# Patient Record
Sex: FEMALE | Race: WHITE
[De-identification: names, ages, dates, MRNs, and addresses within clinical notes are randomized per-mention and may not be internally consistent; named-entity substitution may affect disease eponyms.]

---

## 2017-08-01 ENCOUNTER — HOSPITAL ENCOUNTER (OUTPATIENT)
Dept: HOSPITAL 7 - FB.SDS | Age: 37
Discharge: HOME | End: 2017-08-01
Attending: SURGERY
Payer: COMMERCIAL

## 2017-08-01 VITALS — SYSTOLIC BLOOD PRESSURE: 126 MMHG | DIASTOLIC BLOOD PRESSURE: 86 MMHG

## 2017-08-01 DIAGNOSIS — K20.9: ICD-10-CM

## 2017-08-01 DIAGNOSIS — K29.50: Primary | ICD-10-CM

## 2017-08-01 DIAGNOSIS — Z79.82: ICD-10-CM

## 2017-08-01 DIAGNOSIS — E66.01: ICD-10-CM

## 2017-08-01 DIAGNOSIS — K29.80: ICD-10-CM

## 2017-08-01 DIAGNOSIS — Z79.899: ICD-10-CM

## 2017-08-01 PROCEDURE — 81025 URINE PREGNANCY TEST: CPT

## 2017-08-01 PROCEDURE — 43239 EGD BIOPSY SINGLE/MULTIPLE: CPT

## 2017-08-01 NOTE — PCM.OPNOTE
- General Post-Op/Procedure Note


Date of Surgery/Procedure: 08/01/17


Operative Procedure(s): egd with bx


Findings: 





erosive esophagitis 


Pre Op Diagnosis: ruq abd pain


Post-Op Diagnosis: Same.  erosive esophagitis


Anesthesia Technique: MAC (s)


Primary Surgeon: Aniket Prince


Anesthesia Provider: Montrell Sow


Pathology: 





duodenum, stomach and distal esophagus 


Complications: None


Condition: Good


Free Text/Narrative:: 





see dictation

## 2017-08-01 NOTE — OR
DATE OF OPERATION:  08/01/2017

 

SURGEON:  Aniket Prince MD

 

PROCEDURE PERFORMED:  Esophagogastroduodenoscopy with cold forceps biopsy.

 

PREOPERATIVE DIAGNOSIS:  Right upper quadrant abdominal pain.

 

POSTOPERATIVE DIAGNOSIS:  Erosive esophagitis.

 

INDICATIONS FOR PROCEDURE:  This is a 37-year-old white female, who is referred

with a history of right upper quadrant abdominal pain.  Workup to date has been

negative with regard to biliary disease.  She was offered and accepted EGD to

further evaluate for possible etiology.

 

DESCRIPTION OF PROCEDURE:  After an excellent IV sedation was administered, the

bite block was inserted.  The flexible endoscope was passed without difficulty

down the patient's esophagus into the stomach.  The stomach was insufflated.

The scope was passed through the pylorus to the second portion of duodenum and

slowly withdrawn.  The following findings were noted.  Duodenum was

unremarkable.  Biopsies were taken due to her complaint of pain.  Stomach was

essentially unremarkable.  Biopsies were taken due to her complaint of pain.

Esophagus at the distal esophagus, there was evidence of erosive esophagitis and

biopsies were taken.  The remainder of the esophageal exam was unremarkable.

The length of the involvement was approximately 3 cm.  The stomach was deflated,

scope was removed.  The patient tolerated the procedure well and was taken to

recovery in good condition.

 

Job#: [id number]

DD: 08/01/2017 0845

DT: 08/01/2017 0910 AS/DANIELLE

## 2017-12-10 ENCOUNTER — HOSPITAL ENCOUNTER (EMERGENCY)
Dept: HOSPITAL 7 - FB.ED | Age: 37
Discharge: NURSING, REHAB OR LONG-TERM CARE | End: 2017-12-10
Payer: COMMERCIAL

## 2017-12-10 VITALS — DIASTOLIC BLOOD PRESSURE: 66 MMHG | SYSTOLIC BLOOD PRESSURE: 138 MMHG

## 2017-12-10 DIAGNOSIS — R10.32: ICD-10-CM

## 2017-12-10 DIAGNOSIS — N39.0: Primary | ICD-10-CM

## 2017-12-10 DIAGNOSIS — E66.9: ICD-10-CM

## 2017-12-10 DIAGNOSIS — Z79.82: ICD-10-CM

## 2017-12-10 PROCEDURE — 87086 URINE CULTURE/COLONY COUNT: CPT

## 2017-12-10 PROCEDURE — 81025 URINE PREGNANCY TEST: CPT

## 2017-12-10 PROCEDURE — 86140 C-REACTIVE PROTEIN: CPT

## 2017-12-10 PROCEDURE — 36415 COLL VENOUS BLD VENIPUNCTURE: CPT

## 2017-12-10 PROCEDURE — 85025 COMPLETE CBC W/AUTO DIFF WBC: CPT

## 2017-12-10 PROCEDURE — 96372 THER/PROPH/DIAG INJ SC/IM: CPT

## 2017-12-10 PROCEDURE — 80053 COMPREHEN METABOLIC PANEL: CPT

## 2017-12-10 PROCEDURE — 81001 URINALYSIS AUTO W/SCOPE: CPT

## 2017-12-10 PROCEDURE — 99284 EMERGENCY DEPT VISIT MOD MDM: CPT

## 2017-12-10 NOTE — EDM.PDOC
ED HPI GENERAL MEDICAL PROBLEM





- General


Chief Complaint: Flank Pain


Stated Complaint: ABDOMINAL PAIN


Time Seen by Provider: 12/10/17 11:00


Source of Information: Reports: Patient


History Limitations: Reports: No Limitations





- History of Present Illness


INITIAL COMMENTS - FREE TEXT/NARRATIVE: 





c/o fever and abd pain x 2d





pain in LLQ





not worked x 2d





no radiation





no dysuria, no frequency





never had colonoscopy





had EGD 8/17 with Dr Prince, showed inflammation of esophagus, had been on 

Protonix, not now





on meloxicam in past, not now





not taken pain meds





some nausea, no vomiting


  ** Flank


Pain Score (Numeric/FACES): 4





- Related Data


 Allergies











Allergy/AdvReac Type Severity Reaction Status Date / Time


 


No Known Allergies Allergy   Verified 12/10/17 11:53











Home Meds: 


 Home Meds





Aspirin [Burke Aspirin] 81 mg PO DAILY 07/31/17 [History]


Cholecalciferol (Vitamin D3) [Delta D3] 400 unit PO DAILY 07/31/17 [History]


Ciprofloxacin HCl [Cipro] 500 mg PO BID 7 Days #14 tablet 12/10/17 [Rx]


Metoclopramide HCl 10 mg PO Q6H PRN #12 tablet 12/10/17 [Rx]


Naproxen [Naprosyn] 500 mg PO BID #30 tablet 12/10/17 [Rx]


metroNIDAZOLE [Flagyl] 500 mg PO TID 7 Days #21 tablet 12/10/17 [Rx]











Past Medical History





- Past Health History


Medical/Surgical History: Denies Medical/Surgical History


Cardiovascular History: Reports: Other (See Below)


Other Cardiovascular History: ATYPICAL CHEST PAIN


Genitourinary History: Reports: UTI, Recurrent


Endocrine/Metabolic History: Reports: Obesity/BMI 30+





Social & Family History





- Family History


Family Medical History: Noncontributory





- Tobacco Use


Smoking Status *Q: Never Smoker


Second Hand Smoke Exposure: Yes





- Caffeine Use


Caffeine Use: Reports: None





- Recreational Drug Use


Recreational Drug Use: No





ED ROS GENERAL





- Review of Systems


Review Of Systems: See Below


Constitutional: Reports: Fever, Other (temp 102.9 at home)


HEENT: Reports: No Symptoms


Respiratory: Reports: No Symptoms


Cardiovascular: Reports: No Symptoms


Endocrine: Reports: No Symptoms


GI/Abdominal: Reports: Abdominal Pain, Nausea.  Denies: Vomiting


: Reports: No Symptoms, Other (last menses 1w ago, usual 4d flow).  Denies: 

Dysuria


Musculoskeletal: Reports: No Symptoms


Skin: Reports: No Symptoms


Neurological: Reports: No Symptoms


Psychiatric: Reports: No Symptoms


Hematologic/Lymphatic: Reports: No Symptoms


Immunologic: Reports: No Symptoms





ED EXAM, GI/ABD





- Physical Exam


Exam: See Below


Exam Limited By: No Limitations


General Appearance: Alert, WD/WN, Mild Distress


Ears: Normal External Exam, Normal Canal, Hearing Grossly Normal


Nose: Normal Inspection, Normal Mucosa, No Blood


Throat/Mouth: Normal Inspection, Normal Lips, Normal Teeth, Normal Gums, Normal 

Oropharynx, Normal Voice, No Airway Compromise


Head: Atraumatic, Normocephalic


Neck: Normal Inspection, Supple, Non-Tender, Full Range of Motion


Respiratory/Chest: No Respiratory Distress, Lungs Clear, Normal Breath Sounds, 

No Accessory Muscle Use, Chest Non-Tender


Cardiovascular: Regular Rate, Rhythm, No Edema, No Gallop, No JVD, No Murmur, 

No Rub


GI/Abdominal Exam: Other (obese, soft, BS present x 4, tender at LLQ along 

lower 1/2 of L colon, slight tender in suprapubic area, NT at flanks and upper 

abd and RLQ, no CVAT b/l)


Back Exam: Normal Inspection, Full Range of Motion, NT


Extremities: Normal Inspection, Normal Range of Motion, Non-Tender, No Pedal 

Edema


Neurological: Alert, Oriented, CN II-XII Intact, Normal Cognition, Normal Gait, 

No Motor/Sensory Deficits


Psychiatric: Normal Affect, Normal Mood


Skin Exam: Warm, Dry, Intact, Normal Color, No Rash


Lymphatic: No Adenopathy





Course





- Vital Signs


Last Recorded V/S: 


 Last Vital Signs











Temp  37.0 C   12/10/17 11:00


 


Pulse  100   12/10/17 10:00


 


Resp  16   12/10/17 10:00


 


BP  144/83 H  12/10/17 10:00


 


Pulse Ox  100   12/10/17 10:00














- Orders/Labs/Meds


Orders: 


 Active Orders 24 hr











 Category Date Time Status


 


 CULTURE URINE [RM] Stat Lab  12/10/17 10:00 Received


 


 Ondansetron [Zofran ODT] Med  12/10/17 12:24 Once





 4 mg PO ONETIME ONE   











Labs: 


 Laboratory Tests











  12/10/17 12/10/17 12/10/17 Range/Units





  10:00 10:00 11:17 


 


WBC    8.9  (4.5-12.0)  X10-3/uL


 


RBC    4.60  (3.23-5.20)  x10(6)uL


 


Hgb    13.2  (11.5-15.5)  g/dL


 


Hct    39.5  (30.0-51.3)  %


 


MCV    85.7  (80-96)  fL


 


MCH    28.6  (27.7-33.6)  pg


 


MCHC    33.4  (32.2-35.4)  g/dL


 


RDW    13.5  (11.5-15.5)  %


 


Plt Count    281  (125-369)  X10(3)uL


 


MPV    7.9  (7.4-10.4)  fL


 


Neut % (Auto)    65.0  (46-82)  %


 


Lymph % (Auto)    24.8  (13-37)  %


 


Mono % (Auto)    7.3  (4-12)  %


 


Eos % (Auto)    3  (1.0-5.0)  %


 


Baso % (Auto)    0  (0-2)  %


 


Neut # (Auto)    5.8  (1.6-8.3)  #


 


Lymph # (Auto)    2.2  (0.6-5.0)  #


 


Mono # (Auto)    0.7  (0.0-1.3)  #


 


Eos # (Auto)    0.2  (0.0-0.8)  #


 


Baso # (Auto)    0.0  (0.0-0.2)  #


 


Sodium     (135-145)  mmol/L


 


Potassium     (3.5-5.3)  mmol/L


 


Chloride     (100-110)  mmol/L


 


Carbon Dioxide     (21-32)  mmol/L


 


BUN     (7-18)  mg/dL


 


Creatinine     (0.55-1.02)  mg/dL


 


Est Cr Clr Drug Dosing     


 


Estimated GFR (MDRD)     (>60)  


 


BUN/Creatinine Ratio     (9-20)  


 


Glucose     ()  mg/dL


 


Calcium     (8.6-10.2)  mg/dL


 


Total Bilirubin     (0.1-1.3)  mg/dL


 


AST     (5-25)  IU/L


 


ALT     (12-36)  U/L


 


Alkaline Phosphatase     ()  IU/L


 


C-Reactive Protein     (0.5-0.9)  mg/dL


 


Total Protein     (6.0-8.0)  g/dL


 


Albumin     (3.5-5.2)  g/dL


 


Globulin     g/dL


 


Albumin/Globulin Ratio     


 


Urine Color   Yellow   (YELLOW)  


 


Urine Appearance   Clear   (CLEAR)  


 


Urine pH   5.0   (5.0-6.5)  


 


Ur Specific Gravity   1.020   (1.010-1.025)  


 


Urine Protein   Negative   (NEGATIVE)  mg/dL


 


Urine Glucose (UA)   Normal   (NEGATIVE)  mg/dL


 


Urine Ketones   Negative   (NEGATIVE)  mg/dL


 


Urine Occult Blood   Negative   (NEGATIVE)  


 


Urine Nitrite   Negative   (NEGATIVE)  


 


Urine Bilirubin   Negative   (NEGATIVE)  


 


Urine Urobilinogen   Normal   (NEGATIVE)  mg/dL


 


Ur Leukocyte Esterase   Moderate H   (NEGATIVE)  


 


Urine RBC   0-5   (0)  


 


Urine WBC   5-10   (0)  


 


Ur Squamous Epith Cells   Few H   (NS,R,O)  


 


Urine Bacteria   Moderate H   (NS)  


 


Urine HCG, Qual  Negative    (NEGATIVE)  














  12/10/17 12/10/17 Range/Units





  11:17 11:17 


 


WBC    (4.5-12.0)  X10-3/uL


 


RBC    (3.23-5.20)  x10(6)uL


 


Hgb    (11.5-15.5)  g/dL


 


Hct    (30.0-51.3)  %


 


MCV    (80-96)  fL


 


MCH    (27.7-33.6)  pg


 


MCHC    (32.2-35.4)  g/dL


 


RDW    (11.5-15.5)  %


 


Plt Count    (125-369)  X10(3)uL


 


MPV    (7.4-10.4)  fL


 


Neut % (Auto)    (46-82)  %


 


Lymph % (Auto)    (13-37)  %


 


Mono % (Auto)    (4-12)  %


 


Eos % (Auto)    (1.0-5.0)  %


 


Baso % (Auto)    (0-2)  %


 


Neut # (Auto)    (1.6-8.3)  #


 


Lymph # (Auto)    (0.6-5.0)  #


 


Mono # (Auto)    (0.0-1.3)  #


 


Eos # (Auto)    (0.0-0.8)  #


 


Baso # (Auto)    (0.0-0.2)  #


 


Sodium  138   (135-145)  mmol/L


 


Potassium  4.0   (3.5-5.3)  mmol/L


 


Chloride  103   (100-110)  mmol/L


 


Carbon Dioxide  27   (21-32)  mmol/L


 


BUN  8   (7-18)  mg/dL


 


Creatinine  0.7   (0.55-1.02)  mg/dL


 


Est Cr Clr Drug Dosing  TNP   


 


Estimated GFR (MDRD)  > 60   (>60)  


 


BUN/Creatinine Ratio  11.4   (9-20)  


 


Glucose  100   ()  mg/dL


 


Calcium  8.9   (8.6-10.2)  mg/dL


 


Total Bilirubin  0.3   (0.1-1.3)  mg/dL


 


AST  12   (5-25)  IU/L


 


ALT  18   (12-36)  U/L


 


Alkaline Phosphatase  52 L   ()  IU/L


 


C-Reactive Protein   1.8 H  (0.5-0.9)  mg/dL


 


Total Protein  8.0   (6.0-8.0)  g/dL


 


Albumin  3.6   (3.5-5.2)  g/dL


 


Globulin  4.4   g/dL


 


Albumin/Globulin Ratio  0.8   


 


Urine Color    (YELLOW)  


 


Urine Appearance    (CLEAR)  


 


Urine pH    (5.0-6.5)  


 


Ur Specific Gravity    (1.010-1.025)  


 


Urine Protein    (NEGATIVE)  mg/dL


 


Urine Glucose (UA)    (NEGATIVE)  mg/dL


 


Urine Ketones    (NEGATIVE)  mg/dL


 


Urine Occult Blood    (NEGATIVE)  


 


Urine Nitrite    (NEGATIVE)  


 


Urine Bilirubin    (NEGATIVE)  


 


Urine Urobilinogen    (NEGATIVE)  mg/dL


 


Ur Leukocyte Esterase    (NEGATIVE)  


 


Urine RBC    (0)  


 


Urine WBC    (0)  


 


Ur Squamous Epith Cells    (NS,R,O)  


 


Urine Bacteria    (NS)  


 


Urine HCG, Qual    (NEGATIVE)  











Meds: 


Medications














Discontinued Medications














Generic Name Dose Route Start Last Admin





  Trade Name Tarik  PRN Reason Stop Dose Admin


 


Ciprofloxacin  500 mg  12/10/17 10:54  12/10/17 11:59





  Ciprofloxacin Hcl  PO  12/10/17 10:55  500 mg





  ONETIME ONE   Administration


 


Ketorolac Tromethamine  60 mg  12/10/17 10:53  12/10/17 12:00





  Toradol  IM  12/10/17 10:54  60 mg





  ONETIME ONE   Administration


 


Metronidazole  500 mg  12/10/17 10:55  12/10/17 11:59





  Flagyl  PO  12/10/17 10:56  500 mg





  ONETIME ONE   Administration














- Re-Assessments/Exams


Free Text/Narrative Re-Assessment/Exam: 





12/10/17 12:25


feeling better after Toradol, still some N, missed work x last 3d





CBC & CMP neg, CRP 1.8, u/a with WBC and bacteria





has UTI





however PE more c/w diverticulitis





will tx for both UTI and diverticulitis





will need imaging if she does not feel better on antibiotics





Departure





- Departure


Time of Disposition: 12:26


Disposition: DC/Tfer to [facility]


Condition: Good


Clinical Impression: 


 Febrile urinary tract infection, Abdominal wall pain in left lower quadrant








- Discharge Information


Prescriptions: 


Ciprofloxacin HCl [Cipro] 500 mg PO BID 7 Days #14 tablet


Metoclopramide HCl 10 mg PO Q6H PRN #12 tablet


 PRN Reason: Nausea


metroNIDAZOLE [Flagyl] 500 mg PO TID 7 Days #21 tablet


Naproxen [Naprosyn] 500 mg PO BID #30 tablet


Referrals: 


Castillo Menard MD [Primary Care Provider] - 


Forms:  ED Department Discharge


Additional Instructions: 


For infection, take ciprofloxacin 500 mg 1 tab 2 times a day for 7 days.





For infection, take metronidazole 500 mg 1 tab 3 times a day for 7 days.





For pain, take naprosyn 500 mg 1 tab 2 times a day for 7 days.





For pain, also take acetaminophen 500 mg 2 tabs 4 times a day.





For nausea, take metoclopramide 10 mg 1 tab every 6 hours as needed.





No work 12/7 to 12/12.





See Dr Menard in 2 days.





Return to ED if you are feeling worse.





Call your Physician or Return to Emergency Department if:





   * Your condition worsens in any way.


   * You develop fever greater than 100.4.


   * You have vomitting that does not stop with medications.


   * You have pain that is not controlled with medications.





- My Orders


Last 24 Hours: 


My Active Orders





12/10/17 10:00


CULTURE URINE [RM] Stat 





12/10/17 12:24


Ondansetron [Zofran ODT]   4 mg PO ONETIME ONE 














- Assessment/Plan


Last 24 Hours: 


My Active Orders





12/10/17 10:00


CULTURE URINE [RM] Stat 





12/10/17 12:24


Ondansetron [Zofran ODT]   4 mg PO ONETIME ONE

## 2018-01-01 ENCOUNTER — HOSPITAL ENCOUNTER (EMERGENCY)
Dept: HOSPITAL 7 - FB.ED | Age: 38
Discharge: HOME | End: 2018-01-01
Payer: COMMERCIAL

## 2018-01-01 VITALS — SYSTOLIC BLOOD PRESSURE: 160 MMHG | DIASTOLIC BLOOD PRESSURE: 90 MMHG

## 2018-01-01 DIAGNOSIS — K64.4: Primary | ICD-10-CM

## 2018-01-01 DIAGNOSIS — E66.01: ICD-10-CM

## 2018-01-01 DIAGNOSIS — Z76.0: ICD-10-CM

## 2018-01-01 DIAGNOSIS — R09.1: ICD-10-CM

## 2018-01-01 DIAGNOSIS — Z77.22: ICD-10-CM

## 2018-01-01 PROCEDURE — 36415 COLL VENOUS BLD VENIPUNCTURE: CPT

## 2018-01-01 PROCEDURE — 85025 COMPLETE CBC W/AUTO DIFF WBC: CPT

## 2018-01-01 PROCEDURE — 85610 PROTHROMBIN TIME: CPT

## 2018-01-01 PROCEDURE — 99284 EMERGENCY DEPT VISIT MOD MDM: CPT

## 2018-01-01 PROCEDURE — 80048 BASIC METABOLIC PNL TOTAL CA: CPT

## 2018-01-01 NOTE — EDM.PDOC
ED HPI GENERAL MEDICAL PROBLEM





- General


Chief Complaint: Gastrointestinal Problem


Stated Complaint: BLOODY STOOL


Time Seen by Provider: 01/01/18 19:22


Source of Information: Reports: Patient, Family


History Limitations: Reports: No Limitations





- History of Present Illness


INITIAL COMMENTS - FREE TEXT/NARRATIVE: 





37 y.o.w.f -morbid obese-came with her mom to the ed due to blood in her stool 

on 2 occasions and epigastric pain. Pt ran out of her PPIs. Pt underwent and 

upper Endoscopy a few months ago and was dx'd with Gastric reflux disease. No N?

V/D no Dizziness or any  other acute medical issues. /90 puse 105 Temp 

36.2  Pulse ox 99% on RA. 


Onset Date: 12/30/17


Onset Time: 07:00


Duration: Day(s):, Intermittent


Location: Reports: Abdomen


Quality: Reports: Other (occ brightred blood in stool)


Severity: Mild


Improves with: Reports: Rest


Worsens with: Reports: Movement


Associated Symptoms: Reports: Other (occ epigastric pain. Pt ran out of her PPBs

)





- Related Data


 Allergies











Allergy/AdvReac Type Severity Reaction Status Date / Time


 


No Known Allergies Allergy   Verified 01/01/18 19:43











Home Meds: 


 Home Meds





Dicyclomine [Bentyl] 10 mg PO QIDACANDBED 01/01/18 [History]


Omeprazole [Omeprazole] 20 mg PO DAILY 01/01/18 [History]


Pantoprazole [ProTONIX Granules***] 40 mg PO DAILY #30 packet 01/01/18 [Rx]











Past Medical History





- Past Health History


Medical/Surgical History: Denies Medical/Surgical History


Cardiovascular History: Reports: Other (See Below)


Other Cardiovascular History: ATYPICAL CHEST PAIN


Gastrointestinal History: Reports: Other (See Below)


Other Gastrointestinal History: overweight


Genitourinary History: Reports: UTI, Recurrent


Endocrine/Metabolic History: Reports: Obesity/BMI 30+





- Infectious Disease History


Infectious Disease History: Reports: Chicken Pox





- Past Surgical History


HEENT Surgical History: Reports: Other (See Below)


Other HEENT Surgeries/Procedures: had oral surgery at one time





Social & Family History





- Family History


Family Medical History: Noncontributory





- Tobacco Use


Smoking Status *Q: Never Smoker


Second Hand Smoke Exposure: Yes





- Caffeine Use


Caffeine Use: Reports: Soda


Caffeine Use Comment: 2 cans a day





- Alcohol Use


Days Per Week of Alcohol Use: 0





- Recreational Drug Use


Recreational Drug Use: No





ED ROS GENERAL





- Review of Systems


Review Of Systems: See Below


Constitutional: Reports: No Symptoms


HEENT: Reports: No Symptoms


Respiratory: Reports: No Symptoms


Cardiovascular: Reports: No Symptoms


Endocrine: Reports: No Symptoms


GI/Abdominal: Reports: Abdominal Pain (epigastric), Bloody Stool (occ)


: Reports: No Symptoms


Musculoskeletal: Reports: No Symptoms


Skin: Reports: No Symptoms


Neurological: Reports: No Symptoms


Psychiatric: Reports: No Symptoms


Hematologic/Lymphatic: Reports: No Symptoms


Immunologic: Reports: No Symptoms





ED EXAM, GI/ABD





- Physical Exam


Exam: See Below


Exam Limited By: No Limitations


General Appearance: Alert, WD/WN, No Apparent Distress, Obese (morbid)


Eyes: Bilateral: Normal Appearance


Ears: Normal External Exam


Nose: Normal Inspection


Throat/Mouth: Normal Inspection


Head: Atraumatic, Normocephalic


Neck: Normal Inspection, Supple, Non-Tender, Full Range of Motion


Respiratory/Chest: No Respiratory Distress, Lungs Clear, Normal Breath Sounds


Cardiovascular: Normal Peripheral Pulses, Regular Rate, Rhythm, No Edema, No 

Gallop, No JVD, No Murmur, No Rub


GI/Abdominal Exam: Normal Bowel Sounds, Soft, Non-Tender, No Organomegaly, No 

Abnormal Bruit, Other (external hemorrhids, no bleed)


 (Female) Exam: Deferred


Rectal (Female) Exam: Normal Exam


Back Exam: Normal Inspection, Full Range of Motion


Extremities: Normal Inspection, Normal Range of Motion


Neurological: Alert, Oriented, CN II-XII Intact, Normal Cognition, Normal Gait


Psychiatric: Normal Affect, Normal Mood


Skin Exam: Warm, Dry, Intact, Normal Color, No Rash


Lymphatic: No Adenopathy





Course





- Vital Signs


Text/Narrative:: 





37 y.o.w.f -morbid obese-came with her mom to the ed due to blood in her stool 

on 2 occasions and epigastric pain. Pt ran out of her PPIs. Pt underwent and 

upper Endoscopy a few months ago and was dx'd with Gastric reflux disease. No N?

V/D no Dizziness or any  other acute medical issues. /90 puse 105 Temp 

36.5  Pulse ox 99% on RA.


PE: Morbid obese 37 y.o.w.f with epigastic tenderness, minor, no rebound no 

guarding. one external hemorrhoid, no thrombosed, no bleed.


Labs: CBC and BMO were nl, Glc was 160, however.


Impression: Hematochezia, external hemorrhoid


Tx: Meds refill for PPI


Plan: D/C with instructions.  


Last Recorded V/S: 


 Last Vital Signs











Temp  36.5 C   01/01/18 19:39


 


Pulse  105 H  01/01/18 19:39


 


Resp  19   01/01/18 19:39


 


BP  160/90 H  01/01/18 19:39


 


Pulse Ox  100   01/01/18 19:39














- Orders/Labs/Meds


Labs: 


 Laboratory Tests











  01/01/18 01/01/18 01/01/18 Range/Units





  19:55 19:55 19:55 


 


WBC  11.4    (4.5-12.0)  X10-3/uL


 


RBC  4.44    (3.23-5.20)  x10(6)uL


 


Hgb  12.8    (11.5-15.5)  g/dL


 


Hct  38.1    (30.0-51.3)  %


 


MCV  85.7    (80-96)  fL


 


MCH  28.7    (27.7-33.6)  pg


 


MCHC  33.5    (32.2-35.4)  g/dL


 


RDW  13.3    (11.5-15.5)  %


 


Plt Count  299    (125-369)  X10(3)uL


 


MPV  8.5    (7.4-10.4)  fL


 


Neut % (Auto)  75.4    (46-82)  %


 


Lymph % (Auto)  17.5    (13-37)  %


 


Mono % (Auto)  5.1    (4-12)  %


 


Eos % (Auto)  2    (1.0-5.0)  %


 


Baso % (Auto)  0    (0-2)  %


 


Neut # (Auto)  8.6 H    (1.6-8.3)  #


 


Lymph # (Auto)  2.0    (0.6-5.0)  #


 


Mono # (Auto)  0.6    (0.0-1.3)  #


 


Eos # (Auto)  0.2    (0.0-0.8)  #


 


Baso # (Auto)  0.0    (0.0-0.2)  #


 


PT   9.7   (8.7-11.1)  


 


INR   0.96   (0.89-1.13)  


 


Sodium    139  (135-145)  mmol/L


 


Potassium    4.0  (3.5-5.3)  mmol/L


 


Chloride    102  (100-110)  mmol/L


 


Carbon Dioxide    24  (21-32)  mmol/L


 


BUN    12  (7-18)  mg/dL


 


Creatinine    0.9  (0.55-1.02)  mg/dL


 


Est Cr Clr Drug Dosing    69.00  mL/min


 


Estimated GFR (MDRD)    > 60  (>60)  


 


BUN/Creatinine Ratio    13.3  (9-20)  


 


Glucose    196 H D  ()  mg/dL


 


Calcium    8.9  (8.6-10.2)  mg/dL











Meds: 


Medications














Discontinued Medications














Generic Name Dose Route Start Last Admin





  Trade Name Freq  PRN Reason Stop Dose Admin


 


Al Hydroxide/Mg Hydroxide  30 ml  01/01/18 19:44  01/01/18 19:48





  Mag-Al Susp  PO  01/01/18 19:45  30 ml





  ONETIME STA   Administration














Departure





- Departure


Time of Disposition: 20:34


Disposition: Home, Self-Care 01


Condition: Good


Clinical Impression: 


 Hematochezia, Medication refill, Pleurisy








- Discharge Information


Prescriptions: 


Pantoprazole [ProTONIX Granules***] 40 mg PO DAILY #30 packet


Instructions:  Hemorrhoids, Easy-to-Read, Secondhand Smoke


Referrals: 


Castillo Menard MD [Primary Care Provider] - 


Forms:  ED Department Discharge


Additional Instructions: 


Please take the meds as recommended, please f/u with your PMD/Dr. Prince. 

please come back if your symptoms get worse acutely

## 2018-01-11 ENCOUNTER — HOSPITAL ENCOUNTER (OUTPATIENT)
Dept: HOSPITAL 7 - FB.SDS | Age: 38
Discharge: HOME | End: 2018-01-11
Attending: SURGERY
Payer: COMMERCIAL

## 2018-01-11 VITALS — DIASTOLIC BLOOD PRESSURE: 73 MMHG | SYSTOLIC BLOOD PRESSURE: 129 MMHG

## 2018-01-11 DIAGNOSIS — Z79.82: ICD-10-CM

## 2018-01-11 DIAGNOSIS — K57.30: Primary | ICD-10-CM

## 2018-01-11 DIAGNOSIS — E66.01: ICD-10-CM

## 2018-01-11 DIAGNOSIS — Z79.899: ICD-10-CM

## 2018-01-11 PROCEDURE — 45378 DIAGNOSTIC COLONOSCOPY: CPT

## 2018-01-11 PROCEDURE — 81025 URINE PREGNANCY TEST: CPT

## 2018-01-11 NOTE — OR
DATE OF OPERATION:  01/11/2018

 

SURGEON:  Aniket Prince MD

 

PROCEDURE PERFORMED:  Colonoscopy.

 

PREOPERATIVE DIAGNOSIS:  History of left upper quadrant abdominal pain as well

as bleeding per rectum.

 

POSTOPERATIVE DIAGNOSIS:  Sigmoid diverticulosis.

 

INDICATIONS FOR PROCEDURE:  This is a 37-year-old white female who was referred

with the above-mentioned complaints.  She was offered and accepted a

colonoscopy.

 

DESCRIPTION OF PROCEDURE:  After an excellent IV sedation was administered,

digital rectal exam was performed.  No marked abnormality was noted.  The

flexible colonoscope was inserted and advanced to the cecum without difficulty.

The prep was excellent.  The following findings were noted.  Ascending colon was

unremarkable.  Transverse colon, unremarkable.  Descending colon, unremarkable.

Sigmoid, occasional diverticula noted.  Rectum, unremarkable and on retroflexing

the scope and examining the anus, there was really no marked hemorrhoidal

tissue.  The colon was deflated.  The scope was removed.  We will have the

patient start a high-fiber diet of either med to include Metamucil and Citrucel

as well as a probiotic and follow up in one month if there is no improvement.

 

Job#: [id number]

DD: 01/11/2018 0922

DT: 01/11/2018 1116 AS/MODL

## 2018-01-11 NOTE — PCM.OPNOTE
- General Post-Op/Procedure Note


Date of Surgery/Procedure: 01/11/18


Operative Procedure(s): c scope


Findings: 





sigmoid diverticulosis 


Pre Op Diagnosis: luq abd pain and hx of bleeding per rectum


Post-Op Diagnosis: sigmoid diverticulosis


Anesthesia Technique: MAC


Primary Surgeon: Aniket Prince


Anesthesia Provider: Montrell Sow


Pathology: 





none





Complications: None


Condition: Good


Free Text/Narrative:: 





see dictation

## 2018-01-13 ENCOUNTER — HOSPITAL ENCOUNTER (EMERGENCY)
Dept: HOSPITAL 7 - FB.ED | Age: 38
Discharge: HOME | End: 2018-01-13
Payer: COMMERCIAL

## 2018-01-13 VITALS — DIASTOLIC BLOOD PRESSURE: 97 MMHG | SYSTOLIC BLOOD PRESSURE: 148 MMHG

## 2018-01-13 DIAGNOSIS — Z77.22: ICD-10-CM

## 2018-01-13 DIAGNOSIS — J20.9: Primary | ICD-10-CM

## 2018-01-13 DIAGNOSIS — Z79.82: ICD-10-CM

## 2018-01-13 PROCEDURE — 99284 EMERGENCY DEPT VISIT MOD MDM: CPT

## 2018-01-13 NOTE — EDM.PDOC
ED HPI GENERAL MEDICAL PROBLEM





- General


Chief Complaint: Respiratory Problem


Stated Complaint: CHEST PAIN


Time Seen by Provider: 01/13/18 20:23


Source of Information: Reports: Patient


History Limitations: Reports: No Limitations





- History of Present Illness


INITIAL COMMENTS - FREE TEXT/NARRATIVE: 





37 y.o.w.f came the the ed with a prd. cough for 1-2 days. Pt does not smoke. 

No F/C N/V or any other acute medical issues.  /108 Pulse 104 Temp 37.1 

Pulse ox 98% on RA, RR 18.  


Onset: Today


Onset Date: 01/12/18


Onset Time: 07:00


Duration: Day(s):, Intermittent


Location: Reports: Chest


Quality: Reports: Ache


Severity: Mild


Improves with: Reports: Rest


Worsens with: Reports: Movement


Context: Reports: Sick Contact





- Related Data


 Allergies











Allergy/AdvReac Type Severity Reaction Status Date / Time


 


No Known Allergies Allergy   Verified 01/13/18 20:29











Home Meds: 


 Home Meds





Aspirin [Halfprin] 81 mg PO Q48H 01/10/18 [History]


Cholecalciferol (Vitamin D3) [Vitamin D3] 400 unit PO DAILY 01/10/18 [History]


Ciprofloxacin HCl [Cipro] 500 mg PO BID #20 tablet 01/13/18 [Rx]











Past Medical History





- Past Health History


Medical/Surgical History: Denies Medical/Surgical History


Cardiovascular History: Reports: Other (See Below)


Other Cardiovascular History: ATYPICAL CHEST PAIN


Respiratory History: Reports: None


Gastrointestinal History: Reports: Gastritis, Other (See Below)


Other Gastrointestinal History: overweight


Genitourinary History: Reports: UTI, Recurrent


OB/GYN History: Reports: None


Musculoskeletal History: Reports: None


Neurological History: Reports: None


Psychiatric History: Reports: None


Endocrine/Metabolic History: Reports: Obesity/BMI 30+


Hematologic History: Reports: None


Immunologic History: Reports: None


Oncologic (Cancer) History: Reports: None


Dermatologic History: Reports: None





- Infectious Disease History


Infectious Disease History: Reports: Chicken Pox





- Past Surgical History


Head Surgeries/Procedures: Reports: None


HEENT Surgical History: Reports: Oral Surgery, Other (See Below)


Other HEENT Surgeries/Procedures: had oral surgery at one time


Respiratory Surgical History: Reports: None


GI Surgical History: Reports: EGD


Female  Surgical History: Reports: None


Endocrine Surgical History: Reports: None


Neurological Surgical History: Reports: None


Musculoskeletal Surgical History: Reports: None


Oncologic Surgical History: Reports: None


Dermatological Surgical History: Reports: None





Social & Family History





- Family History


Family Medical History: Noncontributory





- Tobacco Use


Smoking Status *Q: Never Smoker


Second Hand Smoke Exposure: Yes





- Caffeine Use


Caffeine Use: Reports: None


Caffeine Use Comment: 2 cans a day





- Alcohol Use


Days Per Week of Alcohol Use: 0





- Recreational Drug Use


Recreational Drug Use: No





ED ROS GENERAL





- Review of Systems


Review Of Systems: See Below


Constitutional: Reports: No Symptoms


HEENT: Reports: No Symptoms


Respiratory: Reports: Cough, Sputum


Cardiovascular: Reports: No Symptoms


Endocrine: Reports: No Symptoms


GI/Abdominal: Reports: No Symptoms


: Reports: No Symptoms


Musculoskeletal: Reports: No Symptoms


Skin: Reports: No Symptoms


Neurological: Reports: No Symptoms


Psychiatric: Reports: No Symptoms


Hematologic/Lymphatic: Reports: No Symptoms


Immunologic: Reports: No Symptoms





ED EXAM, GENERAL





- Physical Exam


Exam: See Below


Exam Limited By: No Limitations


General Appearance: Alert, WD/WN, No Apparent Distress, Obese


Eye Exam: Bilateral Eye: Normal Inspection


Ears: Normal External Exam


Ear Exam: Bilateral Ear: Auricle Normal


Nose: Normal Inspection, Normal Mucosa


Throat/Mouth: Normal Inspection, Normal Lips, No Airway Compromise


Head: Atraumatic, Normocephalic


Neck: Normal Inspection, Supple, Non-Tender, Full Range of Motion


Respiratory/Chest: Rhonchi


Cardiovascular: Normal Peripheral Pulses, Regular Rate, Rhythm


GI/Abdominal: Normal Bowel Sounds


 (Female) Exam: Deferred


Rectal (Female) Exam: Deferred


Back Exam: Normal Inspection


Extremities: Normal Inspection


Neurological: Alert, Oriented, CN II-XII Intact, Normal Cognition, Normal Gait, 

No Motor/Sensory Deficits


Psychiatric: Normal Affect, Normal Mood


Skin Exam: Warm, Dry, Intact, Normal Color, No Rash


Lymphatic: No Adenopathy





Course





- Vital Signs


Text/Narrative:: 





37 y.o.w.f came the the ed with a prd. cough for 1-2 days. Pt does not smoke. 

No F/C N/V or any other acute medical issues.  /108 Pulse 104 Temp 37.1 

Pulse ox 98% on RA, RR 18.


PE: Rhonchi with prod cough


Impression: Acute Bronchitis


Tx: cipro


Reexam: Improved


Plan: D/C with instructions





Last Recorded V/S: 


 Last Vital Signs











Temp  37.1 C   01/13/18 20:31


 


Pulse  102 H  01/13/18 20:45


 


Resp  20   01/13/18 20:31


 


BP  148/97 H  01/13/18 20:45


 


Pulse Ox  98   01/13/18 20:31














- Orders/Labs/Meds


Meds: 


Medications














Discontinued Medications














Generic Name Dose Route Start Last Admin





  Trade Name Tarik  PRN Reason Stop Dose Admin


 


Ciprofloxacin  500 mg  01/13/18 20:31  01/13/18 20:41





  Ciprofloxacin Hcl  PO  01/13/18 20:32  500 mg





  ONETIME STA   Administration














Departure





- Departure


Time of Disposition: 20:32


Disposition: Home, Self-Care 01


Condition: Good


Clinical Impression: 


Acute bronchitis


Qualifiers:


 Bronchitis organism: unspecified organism Qualified Code(s): J20.9 - Acute 

bronchitis, unspecified








- Discharge Information


Prescriptions: 


Ciprofloxacin HCl [Cipro] 500 mg PO BID #20 tablet


Referrals: 


Castillo Menard MD [Primary Care Provider] - 


Forms:  ED Department Discharge


Additional Instructions: 


Please increase water intake, please take the meds as  recommended, please 

follow up, come back if your symptoms get worse acutely

## 2018-02-13 ENCOUNTER — HOSPITAL ENCOUNTER (EMERGENCY)
Dept: HOSPITAL 7 - FB.ED | Age: 38
LOS: 1 days | Discharge: HOME | End: 2018-02-14
Payer: COMMERCIAL

## 2018-02-13 DIAGNOSIS — G43.909: Primary | ICD-10-CM

## 2018-02-13 DIAGNOSIS — E66.9: ICD-10-CM

## 2018-02-13 PROCEDURE — 99282 EMERGENCY DEPT VISIT SF MDM: CPT

## 2018-02-13 PROCEDURE — 96372 THER/PROPH/DIAG INJ SC/IM: CPT

## 2018-02-14 VITALS — DIASTOLIC BLOOD PRESSURE: 81 MMHG | SYSTOLIC BLOOD PRESSURE: 148 MMHG

## 2018-02-14 NOTE — ER
DATE SEEN:  02/13/2018

 

CHIEF COMPLAINT:  Headache.

 

HISTORY OF PRESENT ILLNESS:  This is a 38-year-old female complaining of a

headache.  This started last night.  It is moderate to severe, pounding,

associated with nausea and light sensitivity consistent with typical attack of

migraine headaches.  She has not tried anything to relieve the pain.

 

PAST MEDICAL HISTORY:  Abdominal pain, obesity.

 

ALLERGIES:  No known allergies.

 

PHYSICAL EXAMINATION:  GENERAL:  She is not in any cardiopulmonary distress.

VITAL SIGNS:  Unremarkable.  ENT:  Negative.  EYES:  PERRL.  MENTAL STATUS:

Flat affect.  NEUROLOGIC:  Normal.

 

IMPRESSION:  Migraine headache.

 

PLAN:  Ketorolac and Vistaril.  Rest and follow up p.r.n.

 

TIME SEEN:  0042 hours.

 

Job#: [id number]

DD: 02/14/2018 0042

DT: 02/14/2018 0429 TN/DANIELLE

## 2020-04-16 NOTE — EDM.PDOC
ED HPI GENERAL MEDICAL PROBLEM





- General


Chief Complaint: Respiratory Problem


Stated Complaint: SOB; SORE THROAT


Time Seen by Provider: 04/16/20 01:50


Source of Information: Reports: Patient


History Limitations: Reports: No Limitations





- History of Present Illness


INITIAL COMMENTS - FREE TEXT/NARRATIVE: 





Patient presented to the ED because of sore throat,dyspnea and fever although 

her oxygen saturation was 98% on RA and her Temp upon triage was 98.2. She was 

apparently exposed to a co-worker who works at a kitchen in school at [place], [place] who's  tested positive for Bautista V. Fausto't doesn't know if her co-

worker was tested or not.





- Related Data


 Allergies











Allergy/AdvReac Type Severity Reaction Status Date / Time


 


No Known Allergies Allergy   Verified 08/07/18 01:07











Home Meds: 


 Home Meds





Aspirin [Halfprin] 81 mg PO DAILY 01/10/18 [History]


Cholecalciferol (Vitamin D3) [Vitamin D3] 400 unit PO BID 01/10/18 [History]


L.acidoph,Paracasei, B.lactis [Probiotic] 1 tab PO DAILY 02/14/18 [History]


Citalopram Hydrobromide [Celexa] 20 mg PO DAILY 07/27/18 [History]


Multivitamin [Gummi Bear Multivitamin] 1 each PO DAILY 07/27/18 [History]


Pantoprazole Sodium [Protonix] 40 mg PO DAILY 07/27/18 [History]


Cholecalciferol (Vitamin D3) [Vitamin D3] 1,000 unit PO DAILY 08/07/18 [History]


Citalopram [Citalopram HBr] 20 mg PO DAILY 08/07/18 [History]


L.acidoph,Paracasei, B.lactis [Probiotic] 1 each PO DAILY 08/07/18 [History]


Pantoprazole [ProTONIX***] 40 mg PO DAILY 08/07/18 [History]


Ciprofloxacin HCl [Cipro] 500 mg PO BID #20 tablet 12/22/18 [Rx]


Phenazopyridine HCl [Pyridium] 200 mg PO Q8HR #9 tablet 12/22/18 [Rx]











Past Medical History





- Past Health History


Medical/Surgical History: Denies Medical/Surgical History


Cardiovascular History: Reports: Other (See Below)


Other Cardiovascular History: Hx atypical chest pain.


Respiratory History: Reports: None


Gastrointestinal History: Reports: Diverticulosis, Gastritis, GERD


Other Gastrointestinal History: overweight


Genitourinary History: Reports: UTI, Recurrent


OB/GYN History: Reports: None


Musculoskeletal History: Reports: None


Neurological History: Reports: None


Psychiatric History: Reports: Anxiety, None


Endocrine/Metabolic History: Reports: Obesity/BMI 30+


Hematologic History: Reports: None


Immunologic History: Reports: None


Oncologic (Cancer) History: Reports: None


Dermatologic History: Reports: None





- Infectious Disease History


Infectious Disease History: Reports: Chicken Pox





- Past Surgical History


Head Surgeries/Procedures: Reports: None


HEENT Surgical History: Reports: Other (See Below), Oral Surgery





Social & Family History





- Family History


Family Medical History: Noncontributory





- Tobacco Use


Smoking Status *Q: Never Smoker





- Caffeine Use


Caffeine Use: Reports: None, Soda


Other Caffeine Use: 2 cans day


Caffeine Use Comment: 2 cans a day





ED ROS GENERAL





- Review of Systems


Review Of Systems: See Below


Constitutional: Reports: No Symptoms


HEENT: Reports: No Symptoms


Respiratory: Reports: Shortness of Breath


Cardiovascular: Reports: No Symptoms


Endocrine: Reports: No Symptoms


GI/Abdominal: Reports: No Symptoms





ED EXAM, GENERAL





- Physical Exam


Exam: See Below


Exam Limited By: No Limitations


General Appearance: Alert, No Apparent Distress


Eye Exam: Bilateral Eye: Proptosis


Ears: Normal External Exam


Nose: Normal Inspection, Normal Mucosa


Throat/Mouth: Normal Inspection, Normal Lips


Head: Atraumatic, Normocephalic


Neck: Normal Inspection, Supple, Non-Tender


Respiratory/Chest: No Respiratory Distress, Lungs Clear, Normal Breath Sounds


Cardiovascular: Normal Peripheral Pulses, Regular Rate, Rhythm


GI/Abdominal: Normal Bowel Sounds, Soft, Non-Tender, No Organomegaly


Back Exam: Normal Inspection, Full Range of Motion





Course





- Vital Signs


Text/Narrative:: 





influenza A/B-neg


Covid-19-pending


duoneb


Last Recorded V/S: 


 Last Vital Signs











Temp  36.5 C   04/16/20 01:47


 


Pulse  95   04/16/20 01:47


 


Resp  18   04/16/20 01:47


 


BP  143/97 H  04/16/20 01:47


 


Pulse Ox  100   04/16/20 01:47














- Orders/Labs/Meds


Orders: 


 Active Orders 24 hr











 Category Date Time Status


 


 RT Aerosol Therapy [RC] ASDIRECTED Care  04/16/20 02:09 Active


 


 CORONAVIRUS COVID-19, R[facility] Stat Lab  04/16/20 02:15 Received


 


 INPATIENT Stat Lab  04/16/20 02:15 Received


 


 Isolation [COMM] Routine Oth  04/16/20 02:03 Ordered











Meds: 


Medications














Discontinued Medications














Generic Name Dose Route Start Last Admin





  Trade Name Robertq  PRN Reason Stop Dose Admin


 


Albuterol/Ipratropium  3 ml  04/16/20 02:09  04/16/20 02:12





  Duoneb 3.0-0.5 Mg/3 Ml  NEB  04/16/20 02:10  3 ml





  ONETIME ONE   Administration





     





     





     





     














Departure





- Departure


Time of Disposition: 03:15


Disposition: Home, Self-Care 01


Condition: Good


Clinical Impression: 


 URI (upper respiratory infection)








- Discharge Information


Instructions:  Viral Respiratory Infection, Easy-To-Read


Referrals: 


Castillo Menard MD [Primary Care Provider] - 


Forms:  ED Department Discharge


Additional Instructions: 


Please read discharge instructions on viral URI


Increase oral fluids


tylenol 1000 mg every 8 hours as needed for fever


we will call you if your Covid-19 test is positive


Isolate yourself until your test result is back





Sepsis Event Note





- Evaluation


Sepsis Screening Result: No Definite Risk





- Focused Exam


Vital Signs: 


 Vital Signs











  Temp Pulse Resp BP Pulse Ox


 


 04/16/20 01:47  36.5 C  95  18  143/97 H  100











Date Exam was Performed: 04/16/20


Time Exam was Performed: 03:32





- My Orders


Last 24 Hours: 


My Active Orders





04/16/20 02:03


Isolation [COMM] Routine 





04/16/20 02:09


RT Aerosol Therapy [RC] ASDIRECTED 





04/16/20 02:15


CORONAVIRUS COVID-19, RIC Stat 


INPATIENT Stat 














- Assessment/Plan


Last 24 Hours: 


My Active Orders





04/16/20 02:03


Isolation [COMM] Routine 





04/16/20 02:09


RT Aerosol Therapy [RC] ASDIRECTED 





04/16/20 02:15


CORONAVIRUS COVID-19, RIC Stat 


INPATIENT Stat